# Patient Record
Sex: FEMALE | Race: WHITE
[De-identification: names, ages, dates, MRNs, and addresses within clinical notes are randomized per-mention and may not be internally consistent; named-entity substitution may affect disease eponyms.]

---

## 2020-10-05 NOTE — RAD
XR Knee Lt 3 View



HISTORY: Left knee pain



FINDINGS:

No fracture or dislocation is identified. Degenerative changes are seen.



Reported By: Dell Smith 

Electronically Signed:  10/5/2020 12:41 PM

## 2022-07-19 ENCOUNTER — HOSPITAL ENCOUNTER (OUTPATIENT)
Dept: HOSPITAL 92 - BICMAMMO | Age: 87
Discharge: HOME | End: 2022-07-19
Attending: INTERNAL MEDICINE
Payer: MEDICARE

## 2022-07-19 DIAGNOSIS — Z98.890: ICD-10-CM

## 2022-07-19 DIAGNOSIS — Z80.3: ICD-10-CM

## 2022-07-19 DIAGNOSIS — Z12.31: Primary | ICD-10-CM

## 2022-07-19 DIAGNOSIS — Z85.3: ICD-10-CM

## 2022-07-19 PROCEDURE — 77067 SCR MAMMO BI INCL CAD: CPT

## 2022-07-19 PROCEDURE — 77063 BREAST TOMOSYNTHESIS BI: CPT
